# Patient Record
Sex: MALE | Race: BLACK OR AFRICAN AMERICAN | NOT HISPANIC OR LATINO | Employment: STUDENT | URBAN - NONMETROPOLITAN AREA
[De-identification: names, ages, dates, MRNs, and addresses within clinical notes are randomized per-mention and may not be internally consistent; named-entity substitution may affect disease eponyms.]

---

## 2022-10-04 ENCOUNTER — HOSPITAL ENCOUNTER (EMERGENCY)
Facility: HOSPITAL | Age: 22
Discharge: HOME | End: 2022-10-04
Attending: PHYSICIAN ASSISTANT | Admitting: PHYSICIAN ASSISTANT

## 2022-10-04 ENCOUNTER — APPOINTMENT (OUTPATIENT)
Dept: GENERAL RADIOLOGY | Facility: HOSPITAL | Age: 22
End: 2022-10-04
Attending: PHYSICIAN ASSISTANT

## 2022-10-04 VITALS
SYSTOLIC BLOOD PRESSURE: 146 MMHG | OXYGEN SATURATION: 97 % | DIASTOLIC BLOOD PRESSURE: 90 MMHG | HEART RATE: 101 BPM | RESPIRATION RATE: 16 BRPM | TEMPERATURE: 97.7 F

## 2022-10-04 DIAGNOSIS — S02.2XXA NASAL BONE FRACTURE: ICD-10-CM

## 2022-10-04 DIAGNOSIS — S02.2XXA CLOSED FRACTURE OF NASAL BONE, INITIAL ENCOUNTER: ICD-10-CM

## 2022-10-04 PROCEDURE — 99213 OFFICE O/P EST LOW 20 MIN: CPT | Performed by: PHYSICIAN ASSISTANT

## 2022-10-04 PROCEDURE — G0463 HOSPITAL OUTPT CLINIC VISIT: HCPCS

## 2022-10-04 PROCEDURE — 70160 X-RAY EXAM OF NASAL BONES: CPT

## 2022-10-04 ASSESSMENT — ACTIVITIES OF DAILY LIVING (ADL): ADLS_ACUITY_SCORE: 35

## 2022-10-04 NOTE — ED TRIAGE NOTES
"Patient reports with c/o nose pain, stating \"I think I broke my nose.\" Patient reports that he was at basketball practice when a teamates head hit him in the nose. Small lac noted to left side of nose, nose appears swollen.       "

## 2022-10-04 NOTE — ED TRIAGE NOTES
Pt presents with nose pain from getting hit in basketball. Pt states feels out of place and appears to be as well. Current pain 5/10. Denies vision problems. Denies LOC/ dizziness.

## 2022-10-05 NOTE — ED PROVIDER NOTES
History     Chief Complaint   Patient presents with     Facial Injury     HPI  Shun Abad is a 22 year old male who presents to urgent care for evaluation of injury to nose.  Patient states that he was playing basketball when his teammate accidentally had body in his nose.  Patient states that he did have a bloody nose for a little while.  He denies any loss of consciousness, vision changes, or any other associated symptoms.    Allergies:  No Known Allergies    Problem List:    There are no problems to display for this patient.       Past Medical History:    No past medical history on file.    Past Surgical History:    No past surgical history on file.    Family History:    No family history on file.    Social History:  Marital Status:  Single [1]        Medications:    No current outpatient medications on file.        Review of Systems   HENT:        Nose injury   All other systems reviewed and are negative.      Physical Exam   BP: 146/90  Pulse: 101  Temp: 97.7  F (36.5  C)  Resp: 16  SpO2: 97 %      Physical Exam  Vitals and nursing note reviewed.   Constitutional:       General: He is not in acute distress.     Appearance: Normal appearance. He is not ill-appearing or toxic-appearing.   HENT:      Head:      Comments: Mild swelling noted over nasal bridge.  No septal hematoma is appreciated     Nose: Nasal tenderness present. No nasal deformity or septal deviation.   Cardiovascular:      Rate and Rhythm: Regular rhythm.      Heart sounds: Normal heart sounds.   Pulmonary:      Breath sounds: Normal breath sounds.   Neurological:      Mental Status: He is alert and oriented to person, place, and time.         ED Course                 Procedures             Critical Care time:               Results for orders placed or performed during the hospital encounter of 10/04/22 (from the past 24 hour(s))   XR Nasal Bones 3 Views    Narrative    EXAM: XR NASAL BONES 3 VIEWS  10/4/2022 6:56 PM      HISTORY: trauma to  nose    COMPARISON: None    TECHNIQUE: 2 view nasal bones    FINDINGS: Acute nondisplaced nasal bone fractures through the bridge  of the nose. Paranasal sinuses and mastoid air cells are clear. No  radiopaque foreign bodies.      Impression    IMPRESSION:  Acute nondisplaced nasal bone fractures through the bridge of the  nose.    JUAN ARRIAGA MD         SYSTEM ID:  [id number]       Medications - No data to display    Assessments & Plan (with Medical Decision Making)   #1.  Nasal bone fracture    Discussed exam findings as well as x-ray reading with patient.  Patient has a acute nondisplaced nasal bone fracture through the bridge of the nose.  Patient is encouraged to ice, and take Tylenol or ibuprofen as directed for pain.  We discussed taping for supportive measures and also a facemask if patient is going to continue playing basketball to prevent any additional injury that may deviate the fracture.  Any additional concerns patient can return to urgent care or follow-up with primary care provider.  Patient verbalized understanding and agreement of plan.    I have reviewed the nursing notes.    I have reviewed the findings, diagnosis, plan and need for follow up with the patient.    New Prescriptions    No medications on file       Final diagnoses:   Nasal bone fracture       10/4/2022   [facility]     Shay Adams PA-C  10/04/22 1921

## 2022-10-14 ENCOUNTER — TELEPHONE (OUTPATIENT)
Dept: OTOLARYNGOLOGY | Facility: OTHER | Age: 22
End: 2022-10-14

## 2022-10-14 NOTE — TELEPHONE ENCOUNTER
I attempted to call this patient x5 to let him know about the appointment on Monday. No answer and no voicemail set up.

## 2022-10-14 NOTE — TELEPHONE ENCOUNTER
Per Dr. Hager, this patient needs to see Lore or Gail on Monday to discuss nasal surgery due to recent fracture. He was put on the schedule with Lore. Attempted to call the patient, but there was no answer and no option to leave a voicemail. Will try back later to get a hold of him.